# Patient Record
Sex: FEMALE | Race: WHITE | NOT HISPANIC OR LATINO | Employment: STUDENT | URBAN - METROPOLITAN AREA
[De-identification: names, ages, dates, MRNs, and addresses within clinical notes are randomized per-mention and may not be internally consistent; named-entity substitution may affect disease eponyms.]

---
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
## 2023-09-07 ENCOUNTER — NURSE TRIAGE (OUTPATIENT)
Dept: NURSING | Facility: CLINIC | Age: 18
End: 2023-09-07
Payer: COMMERCIAL

## 2023-09-07 NOTE — TELEPHONE ENCOUNTER
Patient calling reports having intermittent moderate-intense diarrhea for at least the past week. Reports having yellowish watery stools with intermittent abdominal pain. Patient reports the diarrhea is severe with the patient reporting 7 or more stools in a 24 hour period. Denies vomiting, severe abdominal pain and blood in the stool. Advised per protocol to see a provider within 24 hours with patient agreeable. Will call back to schedule with Adriana Espino RN 09/07/23 1:58 AM   [facility] Triage Nurse Advisor    Reason for Disposition   [1] SEVERE diarrhea (e.g., 7 or more times / day more than normal) AND [2] present > 24 hours (1 day)    Additional Information   Negative: Vomiting also present and worse than the diarrhea   Negative: [1] Blood in stool AND [2] without diarrhea   Negative: Diarrhea in a cancer patient who is currently (or recently) receiving chemotherapy or radiation therapy, or cancer patient who has metastatic or end-stage cancer and is receiving palliative care   Negative: Diarrhea begins while taking an antibiotic by mouth (oral antibiotic)   Negative: Shock suspected (e.g., cold/pale/clammy skin, too weak to stand, low BP, rapid pulse)   Negative: Difficult to awaken or acting confused (e.g., disoriented, slurred speech)   Negative: Sounds like a life-threatening emergency to the triager   Negative: [1] SEVERE abdominal pain (e.g., excruciating) AND [2] present > 1 hour   Negative: [1] SEVERE abdominal pain AND [2] age > 60 years   Negative: [1] Blood in the stool AND [2] moderate or large amount of blood   Negative: Black or tarry bowel movements  (Exception: Chronic-unchanged black-grey BMs AND is taking iron pills or Pepto-Bismol.)   Negative: [1] Drinking very little AND [2] dehydration suspected (e.g., no urine > 12 hours, very dry mouth, very lightheaded)   Negative: Patient sounds very sick or weak to the triager   Negative: [1] SEVERE diarrhea (e.g., 7 or more times / day  more than normal) AND [2] age > 60 years   Negative: [1] Constant abdominal pain AND [2] present > 2 hours   Negative: [1] Fever > 103 F (39.4 C) AND [2] not able to get the fever down using Fever Care Advice    Protocols used: Diarrhea-A-AH

## 2023-12-08 ENCOUNTER — HOSPITAL ENCOUNTER (EMERGENCY)
Facility: CLINIC | Age: 18
Discharge: HOME | End: 2023-12-08
Attending: EMERGENCY MEDICINE | Admitting: EMERGENCY MEDICINE
Payer: COMMERCIAL

## 2023-12-08 ENCOUNTER — APPOINTMENT (OUTPATIENT)
Dept: GENERAL RADIOLOGY | Facility: CLINIC | Age: 18
End: 2023-12-08
Attending: EMERGENCY MEDICINE
Payer: COMMERCIAL

## 2023-12-08 VITALS
WEIGHT: 170 LBS | RESPIRATION RATE: 16 BRPM | BODY MASS INDEX: 24.34 KG/M2 | HEIGHT: 70 IN | DIASTOLIC BLOOD PRESSURE: 86 MMHG | TEMPERATURE: 98.8 F | OXYGEN SATURATION: 98 % | HEART RATE: 109 BPM | SYSTOLIC BLOOD PRESSURE: 145 MMHG

## 2023-12-08 DIAGNOSIS — S60.10XA SUBUNGUAL HEMATOMA OF FINGER, INITIAL ENCOUNTER: ICD-10-CM

## 2023-12-08 DIAGNOSIS — S62.639A CLOSED AVULSION FRACTURE OF DISTAL PHALANX OF FINGER, INITIAL ENCOUNTER: ICD-10-CM

## 2023-12-08 PROCEDURE — 99284 EMERGENCY DEPT VISIT MOD MDM: CPT | Mod: 25 | Performed by: EMERGENCY MEDICINE

## 2023-12-08 PROCEDURE — 73140 X-RAY EXAM OF FINGER(S): CPT | Mod: LT

## 2023-12-08 PROCEDURE — 26750 TREAT FINGER FRACTURE EACH: CPT | Mod: 54 | Performed by: EMERGENCY MEDICINE

## 2023-12-08 PROCEDURE — 73140 X-RAY EXAM OF FINGER(S): CPT | Mod: 26 | Performed by: RADIOLOGY

## 2023-12-08 PROCEDURE — 11740 EVACUATION SUBUNGUAL HMTMA: CPT | Mod: F2 | Performed by: EMERGENCY MEDICINE

## 2023-12-08 PROCEDURE — 26750 TREAT FINGER FRACTURE EACH: CPT | Mod: F2 | Performed by: EMERGENCY MEDICINE

## 2023-12-08 RX ORDER — CLINDAMYCIN HCL 300 MG
300 CAPSULE ORAL ONCE
Status: DISCONTINUED | OUTPATIENT
Start: 2023-12-08 | End: 2023-12-09 | Stop reason: HOSPADM

## 2023-12-08 RX ORDER — CLINDAMYCIN HCL 300 MG
300 CAPSULE ORAL 3 TIMES DAILY
Qty: 21 CAPSULE | Refills: 0 | Status: SHIPPED | OUTPATIENT
Start: 2023-12-08 | End: 2023-12-15

## 2023-12-08 ASSESSMENT — ACTIVITIES OF DAILY LIVING (ADL): ADLS_ACUITY_SCORE: 35

## 2023-12-09 NOTE — ED TRIAGE NOTES
Pt ambulatory to triage c/o of finger injury due to bowling ball falling on finger      Triage Assessment (Adult)       Row Name 12/2003          Triage Assessment    Airway WDL WDL        Respiratory WDL    Respiratory WDL WDL        Skin Circulation/Temperature WDL    Skin Circulation/Temperature WDL WDL        Cardiac WDL    Cardiac WDL X;rhythm     Pulse Rate & Regularity tachycardic        Peripheral/Neurovascular WDL    Peripheral Neurovascular WDL WDL        Cognitive/Neuro/Behavioral WDL    Cognitive/Neuro/Behavioral WDL WDL

## 2023-12-09 NOTE — ED PROVIDER NOTES
"    [facility] ([facility])    12/08/23         History     Chief Complaint   Patient presents with    Hand Pain     HPI  Rudolph Hernandez is a 18 year old female who without pertinent past medical history presents the ED for evaluation of hand pain.  Pain is located on her left middle finger.  She was bowling, dropped the ball and attempted to catch it.  The ball smashed her finger.  This occurred approximately 30 minutes prior to arrival.  She has pain/swelling/bruising to the tip of her middle finger.  Denies any additional injuries.      As per triage note, patient was ambulatory to triage with c/o of finger injury due to a bowling ball falling on his finger.      Past Medical History  No past medical history on file.  No past surgical history on file.  clindamycin (CLEOCIN) 300 MG capsule      Allergies   Allergen Reactions    Amoxicillin Anaphylaxis    Cephalexin Anaphylaxis     Family History  No family history on file.  Social History       Past medical history, past surgical history, medications, allergies, family history, and social history were reviewed with the patient. No additional pertinent items.      A complete review of systems was performed with pertinent positives and negatives noted in the HPI, and all other systems negative.    Physical Exam   BP: (!) 162/110  Pulse: (!) 133  Temp: 99.3  F (37.4  C)  Resp: 18  Height: 177.8 cm (5' 10\")  Weight: 77.1 kg (170 lb)  SpO2: 98 %  Physical Exam  Vitals and nursing note reviewed.   Constitutional:       General: She is not in acute distress.     Appearance: Normal appearance.   HENT:      Head: Normocephalic.      Nose: Nose normal.   Eyes:      Pupils: Pupils are equal, round, and reactive to light.   Cardiovascular:      Rate and Rhythm: Normal rate and regular rhythm.   Pulmonary:      Effort: Pulmonary effort is normal.   Abdominal:      General: There is no distension.   Musculoskeletal:         General: No " deformity. Normal range of motion.      Cervical back: Normal range of motion.   Skin:     General: Skin is warm.   Neurological:      Mental Status: She is alert and oriented to person, place, and time.   Psychiatric:         Mood and Affect: Mood normal.           ED Course, Procedures, & Data     ED Course as of 12/09/23 0129   Fri Dec 08, 2023   2055 XR Finger Port Left G/E 2 Views  Independently reviewed x-ray notable for fracture of the third distal phalanx          Results for orders placed or performed during the hospital encounter of 12/08/23   XR Finger Port Left G/E 2 Views     Status: None    Narrative    EXAM: XR FINGER PORT LEFT G/E 2 VIEWS  LOCATION: [facility]  DATE: 12/8/2023    INDICATION: Left finger injury and pain.  COMPARISON: None.      Impression    IMPRESSION:   1.  Nondisplaced fracture of the left third finger distal phalanx tuft.  2.  Normal joint spacing and alignment.        Medications - No data to display  Labs Ordered and Resulted from Time of ED Arrival to Time of ED Departure - No data to display  XR Finger Port Left G/E 2 Views   Final Result   IMPRESSION:    1.  Nondisplaced fracture of the left third finger distal phalanx tuft.   2.  Normal joint spacing and alignment.                 Critical care was not performed.     Medical Decision Making  The patient's presentation was of moderate complexity (an acute complicated injury).    The patient's evaluation involved:  ordering and/or review of 1 test(s) in this encounter (see separate area of note for details)    The patient's management necessitated moderate risk (a decision regarding minor procedure (splint application, nail trephination) with risk factors of none).    Assessment & Plan    X-ray shows a nondisplaced tuft fracture.  Exam also notable for subungual hematoma that was trephinated in the ED.  Given presence of underlying nailbed injury with fracture, discussed with  orthopedics who recommended cephalosporin antibiotics.  Patient has anaphylactic allergy to penicillins and cephalosporins; was started on clindamycin.  Aluminum splint placed.  Discussed proper wound care at home.  Educated on reasons to return to the ED.  Recommend PCP follow-up within the next week.    I have reviewed the nursing notes. I have reviewed the findings, diagnosis, plan and need for follow up with the patient.    Discharge Medication List as of 12/8/2023 10:29 PM        START taking these medications    Details   clindamycin (CLEOCIN) 300 MG capsule Take 1 capsule (300 mg) by mouth 3 times daily for 7 days, Disp-21 capsule, R-0, Local Print             Final diagnoses:   Closed avulsion fracture of distal phalanx of finger, initial encounter   Subungual hematoma of finger, initial encounter       Alec Che DO  [facility] EMERGENCY DEPARTMENT  12/8/2023     Alec Che DO  12/09/23 0129

## 2023-12-09 NOTE — ED NOTES
Pt apparently left prior to getting her discharge paperwork or cleocin. Removed from ER trackboard.

## 2024-03-23 ENCOUNTER — APPOINTMENT (OUTPATIENT)
Dept: GENERAL RADIOLOGY | Facility: CLINIC | Age: 19
End: 2024-03-23
Payer: COMMERCIAL

## 2024-03-23 ENCOUNTER — HOSPITAL ENCOUNTER (EMERGENCY)
Facility: CLINIC | Age: 19
Discharge: HOME | End: 2024-03-24
Attending: STUDENT IN AN ORGANIZED HEALTH CARE EDUCATION/TRAINING PROGRAM | Admitting: STUDENT IN AN ORGANIZED HEALTH CARE EDUCATION/TRAINING PROGRAM
Payer: COMMERCIAL

## 2024-03-23 DIAGNOSIS — R10.13 EPIGASTRIC PAIN: ICD-10-CM

## 2024-03-23 DIAGNOSIS — R74.8 ELEVATED ALKALINE PHOSPHATASE LEVEL: ICD-10-CM

## 2024-03-23 DIAGNOSIS — R07.89 ATYPICAL CHEST PAIN: ICD-10-CM

## 2024-03-23 LAB
ALBUMIN SERPL BCG-MCNC: 4.7 G/DL (ref 3.5–5.2)
ALP SERPL-CCNC: 265 U/L (ref 40–150)
ALT SERPL W P-5'-P-CCNC: 28 U/L (ref 0–50)
ANION GAP SERPL CALCULATED.3IONS-SCNC: 12 MMOL/L (ref 7–15)
AST SERPL W P-5'-P-CCNC: 21 U/L (ref 0–45)
BASOPHILS # BLD AUTO: 0.1 10E3/UL (ref 0–0.2)
BASOPHILS NFR BLD AUTO: 1 %
BILIRUB SERPL-MCNC: 1.1 MG/DL
BUN SERPL-MCNC: 12.6 MG/DL (ref 6–20)
CALCIUM SERPL-MCNC: 10.1 MG/DL (ref 8.6–10)
CHLORIDE SERPL-SCNC: 105 MMOL/L (ref 98–107)
CREAT SERPL-MCNC: 0.64 MG/DL (ref 0.51–0.95)
D DIMER PPP FEU-MCNC: 0.29 UG/ML FEU (ref 0–0.5)
DEPRECATED HCO3 PLAS-SCNC: 24 MMOL/L (ref 22–29)
EGFRCR SERPLBLD CKD-EPI 2021: >90 ML/MIN/1.73M2
EOSINOPHIL # BLD AUTO: 0.1 10E3/UL (ref 0–0.7)
EOSINOPHIL NFR BLD AUTO: 1 %
ERYTHROCYTE [DISTWIDTH] IN BLOOD BY AUTOMATED COUNT: 14.1 % (ref 10–15)
FLUAV RNA SPEC QL NAA+PROBE: NEGATIVE
FLUBV RNA RESP QL NAA+PROBE: NEGATIVE
GLUCOSE SERPL-MCNC: 123 MG/DL (ref 70–99)
HCG INTACT+B SERPL-ACNC: <1 MIU/ML
HCT VFR BLD AUTO: 43.9 % (ref 35–47)
HGB BLD-MCNC: 15.4 G/DL (ref 11.7–15.7)
IMM GRANULOCYTES # BLD: 0 10E3/UL
IMM GRANULOCYTES NFR BLD: 0 %
LIPASE SERPL-CCNC: 36 U/L (ref 13–60)
LYMPHOCYTES # BLD AUTO: 1.9 10E3/UL (ref 0.8–5.3)
LYMPHOCYTES NFR BLD AUTO: 26 %
MCH RBC QN AUTO: 28.3 PG (ref 26.5–33)
MCHC RBC AUTO-ENTMCNC: 35.1 G/DL (ref 31.5–36.5)
MCV RBC AUTO: 81 FL (ref 78–100)
MONOCYTES # BLD AUTO: 0.6 10E3/UL (ref 0–1.3)
MONOCYTES NFR BLD AUTO: 8 %
NEUTROPHILS # BLD AUTO: 4.7 10E3/UL (ref 1.6–8.3)
NEUTROPHILS NFR BLD AUTO: 64 %
NRBC # BLD AUTO: 0 10E3/UL
NRBC BLD AUTO-RTO: 0 /100
PLATELET # BLD AUTO: 331 10E3/UL (ref 150–450)
POTASSIUM SERPL-SCNC: 3.9 MMOL/L (ref 3.4–5.3)
PROT SERPL-MCNC: 7.9 G/DL (ref 6.4–8.3)
RBC # BLD AUTO: 5.44 10E6/UL (ref 3.8–5.2)
RSV RNA SPEC NAA+PROBE: NEGATIVE
SARS-COV-2 RNA RESP QL NAA+PROBE: NEGATIVE
SODIUM SERPL-SCNC: 141 MMOL/L (ref 135–145)
T4 FREE SERPL-MCNC: 0.57 NG/DL (ref 0.9–1.7)
TROPONIN T SERPL HS-MCNC: <6 NG/L
TSH SERPL DL<=0.005 MIU/L-ACNC: 0.01 UIU/ML (ref 0.3–4.2)
WBC # BLD AUTO: 7.3 10E3/UL (ref 4–11)

## 2024-03-23 PROCEDURE — 84484 ASSAY OF TROPONIN QUANT: CPT

## 2024-03-23 PROCEDURE — 80053 COMPREHEN METABOLIC PANEL: CPT

## 2024-03-23 PROCEDURE — 36415 COLL VENOUS BLD VENIPUNCTURE: CPT

## 2024-03-23 PROCEDURE — 99284 EMERGENCY DEPT VISIT MOD MDM: CPT | Mod: 25 | Performed by: STUDENT IN AN ORGANIZED HEALTH CARE EDUCATION/TRAINING PROGRAM

## 2024-03-23 PROCEDURE — 99285 EMERGENCY DEPT VISIT HI MDM: CPT | Mod: 25 | Performed by: STUDENT IN AN ORGANIZED HEALTH CARE EDUCATION/TRAINING PROGRAM

## 2024-03-23 PROCEDURE — 85379 FIBRIN DEGRADATION QUANT: CPT

## 2024-03-23 PROCEDURE — 84443 ASSAY THYROID STIM HORMONE: CPT

## 2024-03-23 PROCEDURE — 93010 ELECTROCARDIOGRAM REPORT: CPT | Performed by: STUDENT IN AN ORGANIZED HEALTH CARE EDUCATION/TRAINING PROGRAM

## 2024-03-23 PROCEDURE — 87637 SARSCOV2&INF A&B&RSV AMP PRB: CPT

## 2024-03-23 PROCEDURE — 76705 ECHO EXAM OF ABDOMEN: CPT | Performed by: STUDENT IN AN ORGANIZED HEALTH CARE EDUCATION/TRAINING PROGRAM

## 2024-03-23 PROCEDURE — 84439 ASSAY OF FREE THYROXINE: CPT

## 2024-03-23 PROCEDURE — 93005 ELECTROCARDIOGRAM TRACING: CPT | Performed by: STUDENT IN AN ORGANIZED HEALTH CARE EDUCATION/TRAINING PROGRAM

## 2024-03-23 PROCEDURE — 85025 COMPLETE CBC W/AUTO DIFF WBC: CPT

## 2024-03-23 PROCEDURE — 71046 X-RAY EXAM CHEST 2 VIEWS: CPT | Mod: 26 | Performed by: RADIOLOGY

## 2024-03-23 PROCEDURE — 83690 ASSAY OF LIPASE: CPT | Performed by: STUDENT IN AN ORGANIZED HEALTH CARE EDUCATION/TRAINING PROGRAM

## 2024-03-23 PROCEDURE — 71046 X-RAY EXAM CHEST 2 VIEWS: CPT

## 2024-03-23 PROCEDURE — 84702 CHORIONIC GONADOTROPIN TEST: CPT

## 2024-03-23 PROCEDURE — 76705 ECHO EXAM OF ABDOMEN: CPT | Mod: 26 | Performed by: STUDENT IN AN ORGANIZED HEALTH CARE EDUCATION/TRAINING PROGRAM

## 2024-03-23 RX ORDER — FAMOTIDINE 10 MG
10 TABLET ORAL 2 TIMES DAILY
Status: DISCONTINUED | OUTPATIENT
Start: 2024-03-23 | End: 2024-03-24 | Stop reason: HOSPADM

## 2024-03-23 ASSESSMENT — ACTIVITIES OF DAILY LIVING (ADL)
ADLS_ACUITY_SCORE: 33
ADLS_ACUITY_SCORE: 35
ADLS_ACUITY_SCORE: 35

## 2024-03-23 ASSESSMENT — COLUMBIA-SUICIDE SEVERITY RATING SCALE - C-SSRS
2. HAVE YOU ACTUALLY HAD ANY THOUGHTS OF KILLING YOURSELF IN THE PAST MONTH?: NO
6. HAVE YOU EVER DONE ANYTHING, STARTED TO DO ANYTHING, OR PREPARED TO DO ANYTHING TO END YOUR LIFE?: NO
1. IN THE PAST MONTH, HAVE YOU WISHED YOU WERE DEAD OR WISHED YOU COULD GO TO SLEEP AND NOT WAKE UP?: NO

## 2024-03-24 VITALS
DIASTOLIC BLOOD PRESSURE: 74 MMHG | OXYGEN SATURATION: 99 % | RESPIRATION RATE: 16 BRPM | BODY MASS INDEX: 25.77 KG/M2 | HEART RATE: 71 BPM | HEIGHT: 70 IN | WEIGHT: 180 LBS | TEMPERATURE: 97.9 F | SYSTOLIC BLOOD PRESSURE: 112 MMHG

## 2024-03-24 PROCEDURE — 250N000013 HC RX MED GY IP 250 OP 250 PS 637

## 2024-03-24 RX ORDER — ONDANSETRON 4 MG/1
4 TABLET, FILM COATED ORAL EVERY 8 HOURS PRN
Qty: 10 TABLET | Refills: 0 | Status: SHIPPED | OUTPATIENT
Start: 2024-03-24

## 2024-03-24 RX ORDER — FAMOTIDINE 20 MG/1
20 TABLET, FILM COATED ORAL 2 TIMES DAILY
Qty: 30 TABLET | Refills: 0 | Status: SHIPPED | OUTPATIENT
Start: 2024-03-24

## 2024-03-24 RX ADMIN — FAMOTIDINE 10 MG: 10 TABLET, FILM COATED ORAL at 00:06

## 2024-03-24 NOTE — ED PROVIDER NOTES
"    [place] EMERGENCY DEPARTMENT ([facility])    3/23/24       ED PROVIDER NOTE    History     Chief Complaint   Patient presents with    Chest Pain     HPI  Rudolph Hernandez is a 18 year old female with PMH Grave's disease (diagnosed in Nov 2023) on methimazole, ADHD, who presents to the Emergency Department with chest pain.     30 mins prior to presentation, sudden onset of sharp chest pain with tightness located at central chest with radiation to Lt>Rt shoulder with sensation of racing heart while sitting watching a movie. Some lightheadedness and nausea, without passing out/vomiting. Chest pain is aggravated with movement. Denied exercise/trauma to the area. No SOB, can walk a few blocks here to hospital without issues. This has never happened before.     She took propranolol prior to coming to ED, but felt like it did not help. Occasional alcohol drinking, including a few sips of wine today. Had chipotle ~1 hr before coming in. Had just had increase in dose of methimazole last month, due for a recheck this week. No history of allergic reaction to wine. Denied substance use.     Past Medical History  No past medical history on file.  No past surgical history on file.  famotidine (PEPCID) 20 MG tablet  ondansetron (ZOFRAN) 4 MG tablet      Allergies   Allergen Reactions    Amoxicillin Anaphylaxis    Cephalexin Anaphylaxis     Family History  No family history on file.  Social History          A complete review of systems was performed with pertinent positives and negatives noted in the HPI, and all other systems negative.    Physical Exam   BP: (!) 167/94  Pulse: 109  Temp: 97.9  F (36.6  C)  Resp: 18  Height: 177.8 cm (5' 10\")  Weight: 81.6 kg (180 lb)  SpO2: 99 %  Physical Exam  Constitutional:       Appearance: She is not toxic-appearing.      Comments: Looks flushed, oriented*3, cooperative   Cardiovascular:      Rate and Rhythm: Regular rhythm. Tachycardia present.      Heart sounds: No murmur " heard.  Pulmonary:      Effort: Pulmonary effort is normal.      Breath sounds: Normal breath sounds.   Chest:      Chest wall: Tenderness present.      Comments: Tenderness at Lt upper chest wall on palpation, no erythema/rash  Abdominal:      Palpations: Abdomen is soft.      Tenderness: There is no abdominal tenderness.   Musculoskeletal:      Right lower leg: No edema.      Left lower leg: No edema.   Neurological:      Mental Status: She is alert.       ED Course, Procedures, & Data      Procedures  Results for orders placed during the hospital encounter of 03/23/24    POC US ABDOMEN LIMITED    Impression  Limited Bedside Gallbladder Ultrasound, performed and interpreted by me.  Indication: RUQ/Epigastric Pain  The gall bladder (GB) was evaluated along the short and long axis in real time.  Findings  The gall bladder is not dilated  The anterior GB wall measures ~5mm in a contracted state  GB stones are not seen and there is no GB sludge.  The common bile duct was visualized and measures less than 6 mm in luminal diameter  Sonographic mcnally sign is Absent  No visualized pericholecystic fluid.    IMPRESSION: No evidence of cholesystitis, GB stones, or biliary colic.  Borderline gallbladder wall thickening in the setting of a contracted gallbladder.            EKG Interpretation:      Interpreted by Terrence Alberto MD  Time reviewed: 9:20 PM  Symptoms at time of EKG: chest tightness   Rhythm: normal sinus   Rate: normal  Axis: normal  Ectopy: none  Conduction: normal  ST Segments/ T Waves: No ST-T wave changes  Q Waves: none  Comparison to prior: No old EKG available    Clinical Impression: normal EKG      Results for orders placed or performed during the hospital encounter of 03/23/24   XR Chest 2 Views     Status: None    Narrative    EXAM: XR CHEST 2 VIEWS  LOCATION: [facility]  DATE: 3/23/2024    INDICATION: PMH Grave's disease presenting with tachycardia  and chest pain. To r o pneumonia, PE  COMPARISON: None.      Impression    IMPRESSION:     Heart size is normal. Lungs are clear bilaterally. Mediastinum and visualized bony structures are unremarkable.   POC US ABDOMEN LIMITED     Status: None    Impression    Limited Bedside Gallbladder Ultrasound, performed and interpreted by me.  Indication: RUQ/Epigastric Pain  The gall bladder (GB) was evaluated along the short and long axis in real time.    Findings  The gall bladder is not dilated  The anterior GB wall measures ~5mm in a contracted state  GB stones are not seen and there is no GB sludge.  The common bile duct was visualized and measures less than 6 mm in luminal diameter   Sonographic mcnally sign is Absent  No visualized pericholecystic fluid.    IMPRESSION: No evidence of cholesystitis, GB stones, or biliary colic.  Borderline gallbladder wall thickening in the setting of a contracted gallbladder.       D dimer quantitative     Status: Normal   Result Value Ref Range    D-Dimer Quantitative 0.29 0.00 - 0.50 ug/mL FEU    Narrative    This D-dimer assay is intended for use in conjunction with a clinical pretest probability assessment model to exclude pulmonary embolism (PE) and deep venous thrombosis (DVT) in outpatients suspected of PE or DVT. The cut-off value is 0.50 ug/mL FEU.   Comprehensive metabolic panel     Status: Abnormal   Result Value Ref Range    Sodium 141 135 - 145 mmol/L    Potassium 3.9 3.4 - 5.3 mmol/L    Carbon Dioxide (CO2) 24 22 - 29 mmol/L    Anion Gap 12 7 - 15 mmol/L    Urea Nitrogen 12.6 6.0 - 20.0 mg/dL    Creatinine 0.64 0.51 - 0.95 mg/dL    GFR Estimate >90 >60 mL/min/1.73m2    Calcium 10.1 (H) 8.6 - 10.0 mg/dL    Chloride 105 98 - 107 mmol/L    Glucose 123 (H) 70 - 99 mg/dL    Alkaline Phosphatase 265 (H) 40 - 150 U/L    AST 21 0 - 45 U/L    ALT 28 0 - 50 U/L    Protein Total 7.9 6.4 - 8.3 g/dL    Albumin 4.7 3.5 - 5.2 g/dL    Bilirubin Total 1.1 <=1.2 mg/dL   TSH with free T4  reflex     Status: Abnormal   Result Value Ref Range    TSH 0.01 (L) 0.30 - 4.20 uIU/mL   Symptomatic Influenza A/B, RSV, & SARS-CoV2 PCR (COVID-19) Nose     Status: Normal    Specimen: Nose; Swab   Result Value Ref Range    Influenza A PCR Negative Negative    Influenza B PCR Negative Negative    RSV PCR Negative Negative    SARS CoV2 PCR Negative Negative    Narrative    Testing was performed using the Xpert Xpress CoV2/Flu/RSV Assay on the [employer] GeneXpert Instrument. This test should be ordered for the detection of SARS-CoV-2, influenza, and RSV viruses in individuals who meet clinical and/or epidemiological criteria. Test performance is unknown in asymptomatic patients. This test is for in vitro diagnostic use under the FDA EUA for laboratories certified under CLIA to perform high or moderate complexity testing. This test has not been FDA cleared or approved. A negative result does not rule out the presence of PCR inhibitors in the specimen or target RNA in concentration below the limit of detection for the assay. If only one viral target is positive but coinfection with multiple targets is suspected, the sample should be re-tested with another FDA cleared, approved, or authorized test, if coinfection would change clinical management. This test was validated by the [facility]. These laboratories are certified under the Clinical Laboratory Improvement Amendments of 1988 (CLIA-88) as qualified to perform high complexity laboratory testing.   Troponin T, High Sensitivity     Status: Normal   Result Value Ref Range    Troponin T, High Sensitivity <6 <=14 ng/L   CBC with platelets and differential     Status: Abnormal   Result Value Ref Range    WBC Count 7.3 4.0 - 11.0 10e3/uL    RBC Count 5.44 (H) 3.80 - 5.20 10e6/uL    Hemoglobin 15.4 11.7 - 15.7 g/dL    Hematocrit 43.9 35.0 - 47.0 %    MCV 81 78 - 100 fL    MCH 28.3 26.5 - 33.0 pg    MCHC 35.1 31.5 - 36.5 g/dL    RDW 14.1 10.0 - 15.0 %     Platelet Count 331 150 - 450 10e3/uL    % Neutrophils 64 %    % Lymphocytes 26 %    % Monocytes 8 %    % Eosinophils 1 %    % Basophils 1 %    % Immature Granulocytes 0 %    NRBCs per 100 WBC 0 <1 /100    Absolute Neutrophils 4.7 1.6 - 8.3 10e3/uL    Absolute Lymphocytes 1.9 0.8 - 5.3 10e3/uL    Absolute Monocytes 0.6 0.0 - 1.3 10e3/uL    Absolute Eosinophils 0.1 0.0 - 0.7 10e3/uL    Absolute Basophils 0.1 0.0 - 0.2 10e3/uL    Absolute Immature Granulocytes 0.0 <=0.4 10e3/uL    Absolute NRBCs 0.0 10e3/uL   T4 free     Status: Abnormal   Result Value Ref Range    Free T4 0.57 (L) 0.90 - 1.70 ng/dL   Lipase     Status: Normal   Result Value Ref Range    Lipase 36 13 - 60 U/L   HCG quantitative pregnancy (blood)     Status: Normal   Result Value Ref Range    hCG Quantitative <1 <5 mIU/mL   EKG 12 lead     Status: None (Preliminary result)   Result Value Ref Range    Systolic Blood Pressure  mmHg    Diastolic Blood Pressure  mmHg    Ventricular Rate 97 BPM    Atrial Rate 97 BPM    TN Interval 162 ms    QRS Duration 84 ms     ms    QTc 429 ms    P Axis 55 degrees    R AXIS 61 degrees    T Axis 39 degrees    Interpretation ECG       Sinus rhythm  Possible Left atrial enlargement  Borderline ECG     CBC with platelets differential     Status: Abnormal    Narrative    The following orders were created for panel order CBC with platelets differential.  Procedure                               Abnormality         Status                     ---------                               -----------         ------                     CBC with platelets and d...[[id number]]  Abnormal            Final result                 Please view results for these tests on the individual orders.     Medications   famotidine (PEPCID) tablet 10 mg (10 mg Oral $Given 3/24/24 0006)     Labs Ordered and Resulted from Time of ED Arrival to Time of ED Departure   COMPREHENSIVE METABOLIC PANEL - Abnormal       Result Value    Sodium 141      Potassium  3.9      Carbon Dioxide (CO2) 24      Anion Gap 12      Urea Nitrogen 12.6      Creatinine 0.64      GFR Estimate >90      Calcium 10.1 (*)     Chloride 105      Glucose 123 (*)     Alkaline Phosphatase 265 (*)     AST 21      ALT 28      Protein Total 7.9      Albumin 4.7      Bilirubin Total 1.1     TSH WITH FREE T4 REFLEX - Abnormal    TSH 0.01 (*)    CBC WITH PLATELETS AND DIFFERENTIAL - Abnormal    WBC Count 7.3      RBC Count 5.44 (*)     Hemoglobin 15.4      Hematocrit 43.9      MCV 81      MCH 28.3      MCHC 35.1      RDW 14.1      Platelet Count 331      % Neutrophils 64      % Lymphocytes 26      % Monocytes 8      % Eosinophils 1      % Basophils 1      % Immature Granulocytes 0      NRBCs per 100 WBC 0      Absolute Neutrophils 4.7      Absolute Lymphocytes 1.9      Absolute Monocytes 0.6      Absolute Eosinophils 0.1      Absolute Basophils 0.1      Absolute Immature Granulocytes 0.0      Absolute NRBCs 0.0     T4 FREE - Abnormal    Free T4 0.57 (*)    D DIMER QUANTITATIVE - Normal    D-Dimer Quantitative 0.29     INFLUENZA A/B, RSV, & SARS-COV2 PCR - Normal    Influenza A PCR Negative      Influenza B PCR Negative      RSV PCR Negative      SARS CoV2 PCR Negative     TROPONIN T, HIGH SENSITIVITY - Normal    Troponin T, High Sensitivity <6     LIPASE - Normal    Lipase 36     HCG QUANTITATIVE PREGNANCY - Normal    hCG Quantitative <1       POC US ABDOMEN LIMITED   Final Result   Limited Bedside Gallbladder Ultrasound, performed and interpreted by me.   Indication: RUQ/Epigastric Pain   The gall bladder (GB) was evaluated along the short and long axis in real time.     Findings   The gall bladder is not dilated   The anterior GB wall measures ~5mm in a contracted state   GB stones are not seen and there is no GB sludge.   The common bile duct was visualized and measures less than 6 mm in luminal diameter    Sonographic mcnally sign is Absent   No visualized pericholecystic fluid.      IMPRESSION: No  evidence of cholesystitis, GB stones, or biliary colic.  Borderline gallbladder wall thickening in the setting of a contracted gallbladder.            XR Chest 2 Views   Final Result   IMPRESSION:       Heart size is normal. Lungs are clear bilaterally. Mediastinum and visualized bony structures are unremarkable.               Assessment & Plan    18 year old female with PMH Grave's disease (diagnosed in Nov 2023) on methimazole, ADHD, who presents to the Emergency Department with chest pain.     Vitals significant for elevated /94 and tachycardia  on presentation, otherwise afebrile and satting well 99% on RA. PE remarkable for tenderness at Lt upper chest wall on palpation, no erythema/rash, tachycardic but regular pulse, no murmur.     Differential diagnoses include: MI, PE, thyroid storm (scoring 25 on likelihood score), URI, costochondritis, esophagitis/gastritis, less likely allergic reaction.     Initial CLARKE include: EKG, troponin, TSH/T4, Flu/Covid, CBC, CMP, CXR.    10:31 PM TSH significant for 0.01, CMP significant for elevated , TB 1.1 (no prior labs), troponin <6, elyte and CBC WNL, d-dimer 0.29.     10:52 PM CXR: Heart size is normal. Lungs are clear bilaterally. Mediastinum and visualized bony structures are unremarkable.     11:15 PM Developed another 1 min episode of chest tightness and heart racing which subsided spontaneously. lipase 36. fT4 0.57 (low). Flu/Covid neg. Pending serum hCG.     Bedside POCUS showed: no visualized gallstone in gall bladder or CBD, contracted gall bladder, no dilated CBD, no pericystic fluid, slight thickened wall (0.5 cm), possibly from GB contraction, Ferrer's sign negative.     Pepcid 10 mg po stat, and trial of eating to see if still nausea/vomiting.     Diagnosis unclear, possibly esophagitis and costochondritis. However negative for life-threatening cardiac cause including MI, PE. Low fT4 together with low clinical likelihood score rules out  thyroid storm. Flu and Covid, CXR, and pregnancy test negative. Recommend symptomatic management with antacid and pain control as needed. Disposition to home recommended and patient expressed understanding. Recommend follow-up outpatient to monitor symptoms and thyroid medication adjustment.     I have reviewed the nursing notes. I have reviewed the findings, diagnosis, plan and need for follow up with the patient.    Discharge Medication List as of 3/24/2024 12:07 AM        START taking these medications    Details   famotidine (PEPCID) 20 MG tablet Take 1 tablet (20 mg) by mouth 2 times daily, Disp-30 tablet, R-0, E-Prescribe      ondansetron (ZOFRAN) 4 MG tablet Take 1 tablet (4 mg) by mouth every 8 hours as needed for nausea, Disp-10 tablet, R-0, E-Prescribe             Final diagnoses:   Atypical chest pain   Epigastric pain   Elevated alkaline phosphatase level       Terrence Alberto MD  [facility] EMERGENCY DEPARTMENT  3/23/2024    --    ED Attending Physician Attestation    I Marlon Sims MD, cared for this patient with the Resident. I have performed a history and physical examination of the patient and discussed management with the resident. I reviewed the resident's documentation above and agree with the documented findings and plan of care.    Summary of HPI, PE, ED Course   Patient is a 18 year old female evaluated in the emergency department for chest pain/epigastric pain. Exam and ED course notable for reassuring abdominal exam.  Reassuring vital signs.  Labs were most notable for a slightly elevated alkaline phosphatase level.  Bedside ultrasound of the gallbladder was obtained to further evaluate.  The wall appears slightly thickened but this is most likely due to the contracted state of the gallbladder.  There is no sludge or stones visualized.  No secondary signs of cholecystitis.  No significant transaminitis or bilirubin abnormalities.  Rest of metabolic panel was reassuring  aside from a slightly elevated calcium level.  Patient has a history of Graves' disease, TSH was low but T4 is also low.  Does not appear to be in thyroid storm.  Stable to follow-up with primary care.  D-dimer negative, troponin negative.  Lipase within normal limits.  Pregnancy test negative.  Specific etiology of symptoms is unclear at this time.  Possibly related to GERD/dyspepsia.  Does not appear to be obviously life-threatening condition.  Stable for symptom management and outpatient basis with primary care follow-up.  Patient verbalizes understanding and agreement with proposed treatment plan.  Return precautions provided.. After the completion of care in the emergency department, the patient was discharged.    Critical Care & Procedures  Not applicable.        Medical Decision Making  The patient's presentation was of moderate complexity (an undiagnosed new problem with uncertain diagnosis).    The patient's evaluation involved:  ordering and/or review of 3+ test(s) in this encounter (see separate area of note for details)    The patient's management necessitated moderate risk (prescription drug management including medications given in the ED).      Marlon Sims Jr., MD   Emergency Medicine        Marlon Sims MD  03/24/24 0118

## 2024-03-24 NOTE — DISCHARGE INSTRUCTIONS
Your symptoms could be from an inflammation of your esophagus and chest wall. Antacid (pepcid) and pain medication (including warm compress, topical gel, tylenol) are recommended for symptom management. Work-up were negative for heart attack, blood clot, gall bladder disease, Flu/Covid, pneumonia, and fracture.     Follow up with your PCP to monitor symptoms and thyroid medication adjustment. Return to the ED if you develop new or concerning symptoms.

## 2024-03-24 NOTE — ED TRIAGE NOTES
"BP (!) 167/94   Pulse 109   Temp 97.9  F (36.6  C) (Oral)   Resp 18   Ht 1.778 m (5' 10\")   Wt 81.6 kg (180 lb)   SpO2 99%   BMI 25.83 kg/m      Patient ambulatory from home with complaint of medial chest pain with SOB. Endorses some nausea without vomiting.      Triage Assessment (Adult)       Row Name 03/23/24 2056          Triage Assessment    Airway WDL WDL        Respiratory WDL    Respiratory WDL WDL        Skin Circulation/Temperature WDL    Skin Circulation/Temperature WDL WDL        Cardiac WDL    Cardiac WDL WDL     Cardiac Rhythm NSR        Peripheral/Neurovascular WDL    Peripheral Neurovascular WDL WDL        Cognitive/Neuro/Behavioral WDL    Cognitive/Neuro/Behavioral WDL WDL                     "

## 2024-03-25 ENCOUNTER — NURSE TRIAGE (OUTPATIENT)
Dept: NURSING | Facility: CLINIC | Age: 19
End: 2024-03-25
Payer: COMMERCIAL

## 2024-03-25 LAB
ATRIAL RATE - MUSE: 97 BPM
DIASTOLIC BLOOD PRESSURE - MUSE: NORMAL MMHG
INTERPRETATION ECG - MUSE: NORMAL
P AXIS - MUSE: 55 DEGREES
PR INTERVAL - MUSE: 162 MS
QRS DURATION - MUSE: 84 MS
QT - MUSE: 338 MS
QTC - MUSE: 429 MS
R AXIS - MUSE: 61 DEGREES
SYSTOLIC BLOOD PRESSURE - MUSE: NORMAL MMHG
T AXIS - MUSE: 39 DEGREES
VENTRICULAR RATE- MUSE: 97 BPM

## 2024-03-25 NOTE — TELEPHONE ENCOUNTER
Nurse Triage SBAR    Situation: Rash.     Background:   -Patient calling  -It is okay to call back and leave a detailed message at this number:    Assessment: Pt was seen two days ago at ER and was given Pepcid.  Has taken med for three doses. She stated she now has hives on her lower abdomen, bilateral hips and legs, and lower right forearm. Rash is itchy. Pt's last dose was this morning. She noticed rash after dose this morning.     -no difficulty breathing  -no confusion/disorientation    Recommendation:     -Plans to follow recommendations  -If nothing is available go to /[facility]  -Call back with and questions, concerns, or any change in symptoms           Reason for Disposition   [1] Drug rash suspected AND [2] started taking new medicine within last 2 weeks(Exception: Antihistamine, eye drops, ear drops, decongestant or other OTC cough/cold medicines.)   Hives or itching    Additional Information   Negative: [1] Life-threatening reaction (anaphylaxis) in the past to similar substance (e.g., food, insect bite/sting, chemical, etc.) AND [2] < 2 hours since exposure   Negative: Difficulty breathing or wheezing now   Negative: [1] Swollen tongue AND [2] rapid onset   Negative: [1] Hoarseness or cough now AND [2] rapid onset   Negative: Shock suspected (e.g., cold/pale/clammy skin, too weak to stand, low BP, rapid pulse)   Negative: Difficult to awaken or acting confused (e.g., disoriented, slurred speech)   Negative: Sounds like a life-threatening emergency to the triager   Negative: [1] Life-threatening reaction (anaphylaxis) in the past to the same drug AND [2] < 2 hours since exposure   Negative: Difficulty breathing or wheezing   Negative: [1] Hoarseness or cough AND [2] started soon after 1st dose of drug   Negative: [1] Swollen tongue AND [2] started soon after 1st dose of drug   Negative: [1] Purple or blood-colored rash (spots or dots) AND [2] fever   Negative: Sounds like a life-threatening emergency to the  triager   Negative: [1] Life-threatening reaction (anaphylaxis) in the past to similar substance (e.g., food, insect bite/sting, chemical, etc.) AND [2] < 2 hours since exposure   Negative: [1] Sudden onset of rash (within last 2 hours) AND [2] difficulty breathing or swallowing   Negative: Shock suspected (e.g., cold/pale/clammy skin, too weak to stand, low BP, rapid pulse)   Negative: Difficult to awaken or acting confused (e.g., disoriented, slurred speech)   Negative: [1] Purple or blood-colored spots or dots AND [2] fever   Negative: Sounds like a life-threatening emergency to the triager   Negative: [1] Bright red, sunburn-like rash AND [2] current tampon use or nasal packing   Negative: [1] Bright red, sunburn-like rash AND [3] wound infection or recent surgery   Negative: [1] Bright red skin AND [2] peels off in sheets   Negative: Stiff neck (can't touch chin to chest)   Negative: Fever   Negative: Joint pain or swelling   Negative: Patient sounds very sick or weak to the triager   Negative: [1] Purple or blood-colored rash (spots or dots) AND [2] no fever AND [3] sounds well to triager   Negative: Large or small blisters on skin (i.e., fluid filled bubbles or sacs)   Negative: Bloody crusts on lips or sores in mouth   Negative: Face becomes swollen   Negative: [1] Headache AND [2] no fever   Negative: Swollen tongue   Negative: [1] Widespread hives AND [2] onset < 2 hours of exposure to 1st dose of drug   Negative: Fever   Negative: Patient sounds very sick or weak to the triager   Negative: [1] Purple or blood-colored rash (spots or dots) AND [2] no fever AND [3] sounds well to triager   Negative: [1] Taking new prescription medication AND [2] rash within 4 hours of 1st dose   Negative: Large or small blisters on skin (i.e., fluid filled bubbles or sacs)   Negative: Bloody crusts on lips or sores in mouth   Negative: Face or lip swelling    Protocols used: Hives-A-AH, Rash - Widespread On Drugs-A-AH, Rash  or Redness - Widespread-A-AH

## 2024-04-01 ENCOUNTER — HOSPITAL ENCOUNTER (OUTPATIENT)
Dept: CARDIOLOGY | Facility: CLINIC | Age: 19
Discharge: HOME | End: 2024-04-01
Attending: INTERNAL MEDICINE | Admitting: INTERNAL MEDICINE
Payer: COMMERCIAL

## 2024-04-01 DIAGNOSIS — R94.31 ABNORMAL ECG: ICD-10-CM

## 2024-04-01 LAB — LVEF ECHO: NORMAL

## 2024-04-01 PROCEDURE — 93306 TTE W/DOPPLER COMPLETE: CPT

## 2024-04-01 PROCEDURE — 93306 TTE W/DOPPLER COMPLETE: CPT | Mod: 26 | Performed by: STUDENT IN AN ORGANIZED HEALTH CARE EDUCATION/TRAINING PROGRAM

## 2024-05-19 ENCOUNTER — HEALTH MAINTENANCE LETTER (OUTPATIENT)
Age: 19
End: 2024-05-19

## 2024-09-02 ENCOUNTER — MEDICAL CORRESPONDENCE (OUTPATIENT)
Dept: HEALTH INFORMATION MANAGEMENT | Facility: CLINIC | Age: 19
End: 2024-09-02

## 2024-09-03 ENCOUNTER — OFFICE VISIT (OUTPATIENT)
Dept: OPHTHALMOLOGY | Facility: CLINIC | Age: 19
End: 2024-09-03
Attending: OPHTHALMOLOGY
Payer: COMMERCIAL

## 2024-09-03 DIAGNOSIS — E07.9 THYROID EYE DISEASE: Primary | ICD-10-CM

## 2024-09-03 DIAGNOSIS — H57.89 THYROID EYE DISEASE: Primary | ICD-10-CM

## 2024-09-03 PROCEDURE — 92285 EXTERNAL OCULAR PHOTOGRAPHY: CPT | Performed by: OPHTHALMOLOGY

## 2024-09-03 PROCEDURE — 92002 INTRM OPH EXAM NEW PATIENT: CPT | Mod: GC | Performed by: OPHTHALMOLOGY

## 2024-09-03 PROCEDURE — 99214 OFFICE O/P EST MOD 30 MIN: CPT | Performed by: OPHTHALMOLOGY

## 2024-09-03 RX ORDER — METHIMAZOLE 10 MG/1
20 TABLET ORAL
COMMUNITY
Start: 2023-09-26

## 2024-09-03 ASSESSMENT — CUP TO DISC RATIO
OS_RATIO: 0.2
OD_RATIO: 0.15

## 2024-09-03 ASSESSMENT — CONF VISUAL FIELD
OD_SUPERIOR_TEMPORAL_RESTRICTION: 0
OD_SUPERIOR_NASAL_RESTRICTION: 0
OD_INFERIOR_NASAL_RESTRICTION: 0
OD_INFERIOR_TEMPORAL_RESTRICTION: 0
METHOD: TOYS
OS_INFERIOR_NASAL_RESTRICTION: 0
OS_SUPERIOR_NASAL_RESTRICTION: 0
OD_NORMAL: 1
OS_INFERIOR_TEMPORAL_RESTRICTION: 0
OS_SUPERIOR_TEMPORAL_RESTRICTION: 0
OS_NORMAL: 1

## 2024-09-03 ASSESSMENT — VISUAL ACUITY
OS_SC+: -1
METHOD: SNELLEN - LINEAR
OD_SC+: -3
OS_SC: 20/20
OD_SC: 20/20

## 2024-09-03 ASSESSMENT — TONOMETRY
OS_IOP_MMHG: 20
OD_IOP_MMHG: 18
IOP_METHOD: ICARE SINGLE

## 2024-09-03 ASSESSMENT — EXTERNAL EXAM - LEFT EYE: OS_EXAM: NORMAL

## 2024-09-03 ASSESSMENT — LAGOPHTHALMOS
OS_LAGOPHTHALMOS: 1
OD_LAGOPHTHALMOS: 1

## 2024-09-03 ASSESSMENT — EXTERNAL EXAM - RIGHT EYE: OD_EXAM: NORMAL

## 2024-09-03 ASSESSMENT — MARGIN REFLEX DISTANCE
OS_MRD1: 5
OS_MRD2: 5
OD_MRD1: 5
OD_MRD2: 6

## 2024-09-03 NOTE — PATIENT INSTRUCTIONS
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 We recommend avoiding exposure to smoke.     We also recommend you start taking Selenium 100 micrograms 2x/day to help with symptoms of thyroid eye disease.

## 2024-09-03 NOTE — LETTER
2024    RE: Rudolph Hernandez  : 2005  MRN: [id number]    Dear Provider    Thank you for referring your patient, Rudolph Hernandez, to our multi-disciplinary thyroid eye disease clinic recently.  After a thorough history followed by a neuro-ophthalmology, strabismus, and oculoplastics examination, we came to the following conclusions:     Assessment & Plan     HPI: Rudolph Hernandez is here for an initial visit in thyroid eye disease clinic. She was diagnosed with Graves in 2023, treated with methimazole. She has not yet had a formal evaluation for thyroid eye disease. She has been experiencing dry eye symptoms and mild light sensitivity since 2024.     Referring physician: Self-referred    Thyroid history:  Diagnosed when? 10/2023  GALLEGOS: No  Thyroidectomy: No    Pt takes methimazole 5mg daily   Never-smoker    3/23/24 Labs:  Free T4 0.57 Low; TSH 0.01 Low     Eye symptoms (since when): INITIAL VISIT  Proptosis (better/worse/same since last visit): Yes, left>right  Diplopia(better/worse/same since last visit): none  Eyelid retraction(better/worse/same since last visit): no  Tearing(better/worse/same since last visit): no  Redness (better/worse/same since last visit): no  Pain ((better/worse/same since last visit): no  Pain to move the eyes (better/worse/same since last visit): no  Blurred vision: no    Ocular history:   Orbital decompression (date, details): no  Strabismus surgery (date, details): no  Eyelid surgery (date, details): no    Exam:   Shayan (base):  (102)     Better/worse same: INITIAL  Strabismus (better/worse/same): none  Eyelid retraction (better/worse/same): none    SANDRA Score  1. Spontaneous orbital pain.     0  2. Gaze evoked orbital pain.     0  3. Eyelid swelling due to active thyroid eye disease  1  4. Eyelid erythema.      0  5. Conjunctival redness due to active thyroid eye disease . 0  6. Chemosis.        0  7. Inflammation of caruncle OR  plica.   0    Patients assessed after follow-up can be scored out of 10 by  including items 8-10.    8. Increase of > 2mm in proptosis.    na   9. Decrease in uniocular excursion in any direction of > 8 . na  10. Decrease of acuity equivalent to 1 Snellen line.  na  SANDRA SCORE = 1/7    Assessment and Plan:  Mild thyroid eye disease     Thyroid eye disease (PETROS).  The natural history of thyroid eye disease was discussed. We told the patient that typically PETROS will worsen for a period of time, then improve to some degree, and then stabilize without normalizing.  This process can take somewhere between 1 and 3 years on average.  In the meantime, we recommended seeing the patient in the [facility] every 6 months (sooner if the patient experiences worsening vision in either eye).  Once the patient becomes stable for at least 6 months' time, we discussed that the patient may need restorative surgery or prisms.  Finally, we discussed that correcting the thyroid hormone levels does not ensure that the eyes will normalize but that it could help to some degree.      The patient was asked to avoid smoking and second hand smoke.  The patient was instructed to take selenium 100 micrograms 2x/day.  The patient was told to use artificial tears as needed for dryness and irritation.  The patient was instructed to call as soon as possible if they experience visual loss or decline in either eye.     I do not recommend Tepezza at this time with only mild evidence of thyroid eye disease.     Continue artificial tears four times a day as needed.       Thank you for trusting us with the care of your patient.  For further exam details, please feel free to contact our office for additional records.  If you wish to contact us directly regarding this patient please email us or give our clinic a call to arrange a phone conversation.    Sincerely,      Cedrick Carroll MD    Oculoplastic and Orbital  Surgery   Department of Ophthalmology and Visual Neurosciences  [facility]  [email or web address]    Erick Robertson MD  , Neuro-Ophthalmology and Adult Strabismus  Department of Ophthalmology and Visual Neurosciences  [facility]  [email or web address]

## 2024-09-03 NOTE — PROGRESS NOTES
Assessment & Plan     HPI: Rudolph Hernandez is here for an initial visit in thyroid eye disease clinic. She was diagnosed with Graves in October 2023, treated with methimazole. She has not yet had a formal evaluation for thyroid eye disease. She has been experiencing dry eye symptoms and mild light sensitivity since March 2024.     Referring physician: Self-referred    Thyroid history:  Diagnosed when? 10/2023  GALLEGOS: No  Thyroidectomy: No    Pt takes methimazole 5mg daily   Never-smoker    3/23/24 Labs:  Free T4 0.57 Low; TSH 0.01 Low     Eye symptoms (since when): INITIAL VISIT  Proptosis (better/worse/same since last visit): Yes, left>right  Diplopia(better/worse/same since last visit): none  Eyelid retraction(better/worse/same since last visit): no  Tearing(better/worse/same since last visit): no  Redness (better/worse/same since last visit): no  Pain ((better/worse/same since last visit): no  Pain to move the eyes (better/worse/same since last visit): no  Blurred vision: no    Ocular history:   Orbital decompression (date, details): no  Strabismus surgery (date, details): no  Eyelid surgery (date, details): no    Exam:   Shayan (base): 24/24 (102)     Better/worse same: INITIAL  Strabismus (better/worse/same): none  Eyelid retraction (better/worse/same): none    SANDRA Score  1. Spontaneous orbital pain.     0  2. Gaze evoked orbital pain.     0  3. Eyelid swelling due to active thyroid eye disease  1  4. Eyelid erythema.      0  5. Conjunctival redness due to active thyroid eye disease . 0  6. Chemosis.        0  7. Inflammation of caruncle OR plica.   0    Patients assessed after follow-up can be scored out of 10 by  including items 8-10.    8. Increase of > 2mm in proptosis.    na   9. Decrease in uniocular excursion in any direction of > 8 . na  10. Decrease of acuity equivalent to 1 Snellen line.  na  SANDRA SCORE = 1/7    Assessment and Plan:  Mild thyroid eye disease     Thyroid eye disease (PETROS).  The  natural history of thyroid eye disease was discussed. We told the patient that typically PETROS will worsen for a period of time, then improve to some degree, and then stabilize without normalizing.  This process can take somewhere between 1 and 3 years on average.  In the meantime, we recommended seeing the patient in the [facility] every 6 months (sooner if the patient experiences worsening vision in either eye).  Once the patient becomes stable for at least 6 months' time, we discussed that the patient may need restorative surgery or prisms.  Finally, we discussed that correcting the thyroid hormone levels does not ensure that the eyes will normalize but that it could help to some degree.      The patient was asked to avoid smoking and second hand smoke.  The patient was instructed to take selenium 100 micrograms 2x/day.  The patient was told to use artificial tears as needed for dryness and irritation.  The patient was instructed to call as soon as possible if they experience visual loss or decline in either eye.     I do not recommend Tepezza at this time with only mild evidence of thyroid eye disease.     Continue artificial tears four times a day as needed.        Complete documentation of historical and exam elements from today's encounter can be found in the full encounter summary report (not reduplicated in this progress note).  I personally obtained the chief complaint(s) and history of present illness.  I confirmed and edited as necessary the review of systems, past medical/surgical history, family history, social history, and examination findings as documented by others; and I examined the patient myself.  I personally reviewed the relevant tests, images, and reports as documented above.  I formulated and edited as necessary the assessment and plan and discussed the findings and management plan with the patient and family.  I personally reviewed the ophthalmic test(s) associated with  this encounter, agree with the interpretation(s) as documented by the resident/fellow, and have edited the corresponding report(s) as necessary.     Erick Robertson MD

## 2024-09-03 NOTE — NURSING NOTE
Chief Complaint(s) and History of Present Illness(es)       Thyroid Disease              Laterality: both eyes    Associated symptoms: dryness.  Negative for eye pain, redness and tearing    Treatments tried: artificial tears    Comments: Pt uses Systane gtts from 0-3 times a day, it depends on the day. Today pt says her eyes feel normal. Pt has some light sensitivity. No redness or diplopia. Pt says her vision is good. At the end of the day pt says her eyes feel tired.               Comments    Pt takes methimazole 5mg daily   3/23/24   Free T4 0.57 Low   TSH 0.01 Low     Inf; Pt                      
Yes...

## 2024-10-11 ENCOUNTER — TRANSCRIBE ORDERS (OUTPATIENT)
Dept: OTHER | Age: 19
End: 2024-10-11

## 2024-10-11 DIAGNOSIS — E05.00 GRAVES DISEASE: Primary | ICD-10-CM

## 2025-01-07 ENCOUNTER — OFFICE VISIT (OUTPATIENT)
Dept: OPHTHALMOLOGY | Facility: CLINIC | Age: 20
End: 2025-01-07
Attending: OPHTHALMOLOGY
Payer: COMMERCIAL

## 2025-01-07 DIAGNOSIS — E05.00 GRAVES DISEASE: ICD-10-CM

## 2025-01-07 DIAGNOSIS — E07.9 THYROID EYE DISEASE: Primary | ICD-10-CM

## 2025-01-07 DIAGNOSIS — H02.536 LID RETRACTION OF LEFT EYE: ICD-10-CM

## 2025-01-07 DIAGNOSIS — H57.89 THYROID EYE DISEASE: Primary | ICD-10-CM

## 2025-01-07 PROCEDURE — 99214 OFFICE O/P EST MOD 30 MIN: CPT | Performed by: OPHTHALMOLOGY

## 2025-01-07 PROCEDURE — 92012 INTRM OPH EXAM EST PATIENT: CPT | Mod: GC | Performed by: OPHTHALMOLOGY

## 2025-01-07 ASSESSMENT — LAGOPHTHALMOS: OS_LAGOPHTHALMOS: 1

## 2025-01-07 ASSESSMENT — MARGIN REFLEX DISTANCE
OD_MRD1: 4
OS_MRD2: 5
OD_MRD2: 5
OS_MRD1: 5

## 2025-01-07 ASSESSMENT — CONF VISUAL FIELD
OS_INFERIOR_TEMPORAL_RESTRICTION: 0
OD_INFERIOR_TEMPORAL_RESTRICTION: 0
OD_INFERIOR_NASAL_RESTRICTION: 0
METHOD: COUNTING FINGERS
OD_SUPERIOR_NASAL_RESTRICTION: 0
OD_SUPERIOR_TEMPORAL_RESTRICTION: 0
OS_NORMAL: 1
OS_INFERIOR_NASAL_RESTRICTION: 0
OS_SUPERIOR_TEMPORAL_RESTRICTION: 0
OD_NORMAL: 1
OS_SUPERIOR_NASAL_RESTRICTION: 0

## 2025-01-07 ASSESSMENT — VISUAL ACUITY
METHOD: SNELLEN - LINEAR
OS_SC: 20/20
OD_SC: 20/20

## 2025-01-07 ASSESSMENT — CUP TO DISC RATIO
OS_RATIO: 0.15
OD_RATIO: 0.15

## 2025-01-07 ASSESSMENT — TONOMETRY
IOP_METHOD: ICARE
OS_IOP_MMHG: 19
OD_IOP_MMHG: 20

## 2025-01-07 ASSESSMENT — EXTERNAL EXAM - RIGHT EYE: OD_EXAM: NORMAL

## 2025-01-07 ASSESSMENT — EXTERNAL EXAM - LEFT EYE: OS_EXAM: NORMAL

## 2025-01-07 NOTE — NURSING NOTE
Chief Complaint(s) and History of Present Illness(es)       Thyroid Disease              Laterality: both eyes    Comments: Eyes feel less dry since LV. Has not needed to quiana AT nearly as frequently. Now only uses AT monthly. VA stable. No diplopia. Some puffiness around eyes in the AM. Proptosis is stable. Had labs done a month ago, reportedly normal. Will start seeing new provider at [facility] next week.  Takes 10 mg Methimazole daily.  Non smoker.  Inf: pt

## 2025-01-07 NOTE — PROGRESS NOTES
THYROID EYE DISEASE CLINIC - FOLLOW UP VISIT     HPI:     Symptoms of graves disease started in 11/2023, however her eye symptoms started in 1/2024, with bulging of both eyes. Her endocrinologist ( Dr. Velasco) thinks her TFT is under control. She is starting care with Dr. Roblero.     She is taking 5 mg of methimazole since 4/2024. She had TFT checked in 12/2024 last time and was told it's normal ( not on EPIC). She is using Selenium 100 mcg twice a day and artificial tears PRN. She denies any changes in her eyes since last visit. She feels that her eyes get puffier in the morning but the dryness ins better.    Initial HPI: Rudolph Hernandez is here for an initial visit in thyroid eye disease clinic. She was diagnosed with Graves in October 2023, treated with methimazole. She has not yet had a formal evaluation for thyroid eye disease. She has been experiencing dry eye symptoms and mild light sensitivity since March 2024.     Thyroid history:  Diagnosed when? 10/2023  GALLEGOS: No  Thyroidectomy: No    TSI    None    TSH    Recent Labs   Lab Test 03/23/24 2144   TSH 0.01*       03/23/24 Free T4 0.57 LOW     Eye symptoms (since when):  Proptosis (better/worse/same since last visit): Yes, left>right  Diplopia(better/worse/same since last visit): none  Eyelid retraction(better/worse/same since last visit): no  Tearing(better/worse/same since last visit): no  Redness (better/worse/same since last visit): no  Pain ((better/worse/same since last visit): no  Pain to move the eyes (better/worse/same since last visit): no  Blurred vision: no     Ocular history:   Orbital decompression (date, details): no  Strabismus surgery (date, details): no  Eyelid surgery (date, details): no    Medical history:  Graves disease    Patient has a current medication list which includes the following prescription(s): methimazole.  Multivitamin  Selenium    Patient  reports that she has never smoked. She has never used smokeless  tobacco.    Exam:   Shayan (base): 23.5/24 (102)     Better/worse same: same   Strabismus (better/worse/same): none  Eyelid retraction (better/worse/same): none    SANDRA Score  1. Spontaneous orbital pain.                                                   0  2. Gaze evoked orbital pain.                                                   0  3. Eyelid swelling due to active thyroid eye disease              1  4. Eyelid erythema.                                                                 0  5. Conjunctival redness due to active thyroid eye disease .  0  6. Chemosis.                                                                          0  7. Inflammation of caruncle OR plica.                                    0     Patients assessed after follow-up can be scored out of 10 by  including items 8-10.     8. Increase of > 2mm in proptosis.                                         0      9. Decrease in uniocular excursion in any direction of > 8 .  0  10. Decrease of acuity equivalent to 1 Snellen line.              0    SANDRA SCORE = 1/10    Perez Diplopia Score = 0  0 = no diplopia  1 = intermittent (when tired, upon waking, end of day etc)  2 = inconstant (extreme gazes)  3 = constant    Rudolph Hernandez is a 19 year old female with the following diagnoses:   1. Thyroid eye disease    2. Graves disease    3. Lid retraction of left eye       Inactive - Thyroid eye disease (PETROS).  Mild left upper eyelid and left lower eyelid retraction.  She is a little bit bothered by how her eyelids look but she is not worried about it. She is not interested in surgery for her eyelid retraction at this time    Follow-up as needed with thyroid eye disease clinic.       Rodger Lugo MD   Fellow, Neuro-ophthalmology      Complete documentation of historical and exam elements from today's encounter can be found in the full encounter summary report (not reduplicated in this progress note).  I personally obtained the chief  complaint(s) and history of present illness.  I confirmed and edited as necessary the review of systems, past medical/surgical history, family history, social history, and examination findings as documented by others; and I examined the patient myself.  I personally reviewed the relevant tests, images, and reports as documented above.  I formulated and edited as necessary the assessment and plan and discussed the findings and management plan with the patient and family     Erick Robertson MD

## 2025-06-08 ENCOUNTER — HEALTH MAINTENANCE LETTER (OUTPATIENT)
Age: 20
End: 2025-06-08

## 2025-07-01 ENCOUNTER — OFFICE VISIT (OUTPATIENT)
Dept: OPHTHALMOLOGY | Facility: CLINIC | Age: 20
End: 2025-07-01
Attending: OPHTHALMOLOGY
Payer: COMMERCIAL

## 2025-07-01 ENCOUNTER — TELEPHONE (OUTPATIENT)
Dept: OPHTHALMOLOGY | Facility: CLINIC | Age: 20
End: 2025-07-01
Payer: COMMERCIAL

## 2025-07-01 DIAGNOSIS — H02.89 DISTICHIASIS: Primary | ICD-10-CM

## 2025-07-01 PROCEDURE — 67820 REVISE EYELASHES: CPT | Performed by: OPHTHALMOLOGY

## 2025-07-01 PROCEDURE — 99213 OFFICE O/P EST LOW 20 MIN: CPT | Performed by: OPHTHALMOLOGY

## 2025-07-01 ASSESSMENT — CONF VISUAL FIELD
OD_INFERIOR_NASAL_RESTRICTION: 0
OD_SUPERIOR_NASAL_RESTRICTION: 0
OS_SUPERIOR_NASAL_RESTRICTION: 0
OD_SUPERIOR_TEMPORAL_RESTRICTION: 0
OS_NORMAL: 1
OS_INFERIOR_TEMPORAL_RESTRICTION: 0
OS_SUPERIOR_TEMPORAL_RESTRICTION: 0
OD_NORMAL: 1
OD_INFERIOR_TEMPORAL_RESTRICTION: 0
OS_INFERIOR_NASAL_RESTRICTION: 0

## 2025-07-01 ASSESSMENT — MARGIN REFLEX DISTANCE
OD_MRD2: 5
OD_MRD1: 4
OS_MRD1: 5
OS_MRD2: 5

## 2025-07-01 ASSESSMENT — TONOMETRY
OS_IOP_MMHG: 19
IOP_METHOD: SINGLE ICARE
OD_IOP_MMHG: 16

## 2025-07-01 ASSESSMENT — CUP TO DISC RATIO
OS_RATIO: 0.15
OD_RATIO: 0.15

## 2025-07-01 ASSESSMENT — EXTERNAL EXAM - LEFT EYE: OS_EXAM: NORMAL

## 2025-07-01 ASSESSMENT — LAGOPHTHALMOS
OD_LAGOPHTHALMOS: 0
OS_LAGOPHTHALMOS: 1

## 2025-07-01 ASSESSMENT — SLIT LAMP EXAM - LIDS: COMMENTS: NORMAL

## 2025-07-01 ASSESSMENT — VISUAL ACUITY
METHOD: SNELLEN - LINEAR
OS_SC: 20/20
OD_SC: 20/20

## 2025-07-01 ASSESSMENT — EXTERNAL EXAM - RIGHT EYE: OD_EXAM: NORMAL

## 2025-07-01 NOTE — NURSING NOTE
Chief Complaint(s) and History of Present Illness(es)       Thyroid Disease              Laterality: both eyes    Comments: Takes 5mg Methimazole and selenium daily. Non smoker. No changes noticed with eyes, using AT prn   Inf: pt

## 2025-07-01 NOTE — PROGRESS NOTES
THYROID EYE DISEASE CLINIC - FOLLOW UP VISIT     HPI:   Over the winter, she experienced eye irritation and tearing, worse left eye. Now resolved. No new changes in external appearance, double vision, discharge, or irritation.    Initial HPI: Rudolph Hernandez is here for an initial visit in thyroid eye disease clinic. She was diagnosed with Graves in October 2023, treated with methimazole. She has not yet had a formal evaluation for thyroid eye disease. She has been experiencing dry eye symptoms and mild light sensitivity since March 2024.     Thyroid history:  Diagnosed when? 10/2023  GALLEGOS: No  Thyroidectomy: No    TSI    None    TSH    Recent Labs   Lab Test 03/23/24 2144   TSH 0.01*       03/23/24 Free T4 0.57 LOW     Eye symptoms (since when):  Proptosis (better/worse/same since last visit): Yes, left>right  Diplopia(better/worse/same since last visit): none  Eyelid retraction(better/worse/same since last visit): no  Tearing(better/worse/same since last visit): no  Redness (better/worse/same since last visit): no  Pain ((better/worse/same since last visit): no  Pain to move the eyes (better/worse/same since last visit): no  Blurred vision: no     Ocular history:   Orbital decompression (date, details): no  Strabismus surgery (date, details): no  Eyelid surgery (date, details): no    Medical history:  Graves disease    Patient has a current medication list which includes the following prescription(s): levonorgestrel and methimazole.  Multivitamin  Selenium    Patient  reports that she has never smoked. She has never used smokeless tobacco.    Exam:   Shayan (base): 22/23 (102)     Better/worse same: same   Strabismus (better/worse/same): none  Eyelid retraction (better/worse/same): none    SANDRA Score  1. Spontaneous orbital pain.                                                   0  2. Gaze evoked orbital pain.                                                   0  3. Eyelid swelling due to active thyroid eye  disease              1  4. Eyelid erythema.                                                                 0  5. Conjunctival redness due to active thyroid eye disease .  0  6. Chemosis.                                                                          0  7. Inflammation of caruncle OR plica.                                    0     Patients assessed after follow-up can be scored out of 10 by  including items 8-10.     8. Increase of > 2mm in proptosis.                                         0      9. Decrease in uniocular excursion in any direction of > 8 .  0  10. Decrease of acuity equivalent to 1 Snellen line.              0    SANDRA SCORE = 1/10    Perez Diplopia Score = 0  0 = no diplopia  1 = intermittent (when tired, upon waking, end of day etc)  2 = inconstant (extreme gazes)  3 = constant    Inactive - Thyroid eye disease (PETROS).  Mild left upper eyelid and left lower eyelid retraction.   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  I personally obtained the chief complaint(s) and history of present illness.  I confirmed and edited as necessary the review of systems, past medical/surgical history, family history, social history, and examination findings as documented by others; and I examined the patient myself.  I personally reviewed the relevant tests, images, and reports as documented above.  I formulated and edited as necessary the assessment and plan and  discussed the findings and management plan with the patient and family     Erick Robertson MD

## 2025-07-01 NOTE — TELEPHONE ENCOUNTER
Spoke with patient regarding scheduling for an earlier appointment as offered on 7/1/25. Patient rescheduled as offered and is aware of appointment details.-Per Patient